# Patient Record
Sex: FEMALE
[De-identification: names, ages, dates, MRNs, and addresses within clinical notes are randomized per-mention and may not be internally consistent; named-entity substitution may affect disease eponyms.]

---

## 2018-10-30 ENCOUNTER — HOSPITAL ENCOUNTER (INPATIENT)
Dept: HOSPITAL 31 - C.ER | Age: 63
LOS: 2 days | Discharge: HOME | DRG: 249 | End: 2018-11-01
Attending: HOSPITALIST | Admitting: HOSPITALIST
Payer: MEDICAID

## 2018-10-30 VITALS — BODY MASS INDEX: 17.7 KG/M2

## 2018-10-30 DIAGNOSIS — E86.0: Primary | ICD-10-CM

## 2018-10-30 DIAGNOSIS — E87.2: ICD-10-CM

## 2018-10-30 DIAGNOSIS — K52.9: ICD-10-CM

## 2018-10-30 DIAGNOSIS — R63.4: ICD-10-CM

## 2018-10-30 DIAGNOSIS — E87.6: ICD-10-CM

## 2018-10-30 LAB
ALBUMIN SERPL-MCNC: 2.8 G/DL (ref 3.5–5)
ALBUMIN/GLOB SERPL: 0.8 {RATIO} (ref 1–2.1)
ALT SERPL-CCNC: 42 U/L (ref 9–52)
APTT BLD: 44 SECONDS (ref 21–34)
AST SERPL-CCNC: 32 U/L (ref 14–36)
BACTERIA #/AREA URNS HPF: (no result) /[HPF]
BASE EXCESS BLDV CALC-SCNC: -13.7 MMOL/L (ref 0–2)
BASOPHILS # BLD AUTO: 0.1 K/UL (ref 0–0.2)
BASOPHILS NFR BLD: 0.9 % (ref 0–2)
BILIRUB UR-MCNC: NEGATIVE MG/DL
BUN SERPL-MCNC: 2 MG/DL (ref 7–17)
BUN SERPL-MCNC: < 2 MG/DL (ref 7–17)
CALCIUM SERPL-MCNC: 8.2 MG/DL (ref 8.6–10.4)
CALCIUM SERPL-MCNC: 8.7 MG/DL (ref 8.6–10.4)
EOSINOPHIL # BLD AUTO: 0 K/UL (ref 0–0.7)
EOSINOPHIL NFR BLD: 0.4 % (ref 0–4)
ERYTHROCYTE [DISTWIDTH] IN BLOOD BY AUTOMATED COUNT: 14.9 % (ref 11.5–14.5)
GFR NON-AFRICAN AMERICAN: > 60
GFR NON-AFRICAN AMERICAN: > 60
GLUCOSE UR STRIP-MCNC: NORMAL MG/DL
HGB BLD-MCNC: 13.4 G/DL (ref 11–16)
HYALINE CASTS #/AREA URNS LPF: (no result) /LPF (ref 0–2)
INR PPP: 1.7
LEUKOCYTE ESTERASE UR-ACNC: (no result) LEU/UL
LIPASE: 107 U/L (ref 23–300)
LYMPHOCYTES # BLD AUTO: 3.8 K/UL (ref 1–4.3)
LYMPHOCYTES NFR BLD AUTO: 31.7 % (ref 20–40)
MCH RBC QN AUTO: 32.7 PG (ref 27–31)
MCHC RBC AUTO-ENTMCNC: 32.9 G/DL (ref 33–37)
MCV RBC AUTO: 99.2 FL (ref 81–99)
MONOCYTES # BLD: 0.9 K/UL (ref 0–0.8)
MONOCYTES NFR BLD: 7.8 % (ref 0–10)
NEUTROPHILS # BLD: 7 K/UL (ref 1.8–7)
NEUTROPHILS NFR BLD AUTO: 59.2 % (ref 50–75)
NRBC BLD AUTO-RTO: 0.1 % (ref 0–2)
PCO2 BLDV: 34 MMHG (ref 40–60)
PH BLDV: 7.2 [PH] (ref 7.32–7.43)
PH UR STRIP: 5 [PH] (ref 5–8)
PLATELET # BLD: 346 K/UL (ref 130–400)
PMV BLD AUTO: 9.8 FL (ref 7.2–11.7)
PROT UR STRIP-MCNC: NEGATIVE MG/DL
PROTHROMBIN TIME: 18.6 SECONDS (ref 9.7–12.2)
RBC # BLD AUTO: 4.1 MIL/UL (ref 3.8–5.2)
RBC # UR STRIP: (no result) /UL
SP GR UR STRIP: 1 (ref 1–1.03)
SQUAMOUS EPITHIAL: < 1 /HPF (ref 0–5)
UROBILINOGEN UR-MCNC: NORMAL MG/DL (ref 0.2–1)
VENOUS BLOOD GAS PO2: 19 MM/HG (ref 30–55)
WBC # BLD AUTO: 11.9 K/UL (ref 4.8–10.8)

## 2018-10-30 NOTE — CP.PCM.HP
<Ashvin Lynch - Last Filed: 10/30/18 16:51>





History of Present Illness





- History of Present Illness


History of Present Illness: 


Ashvin Lynch H & P for Dr. Maier





Patient is a 63 year old female with no significant past medical history 

presenting with non-bloody diarrhea. She states that she has been having 

diarrhea for at least 2 months and it is not resolving. She describes the diarr

hea to be watery, non-bloody, and she usually gets 2-3 episodes per day. She 

denies having any abdominal pain. She is also complaining of generalized 

weakness and fatigue. She states that she has not eaten anything today due to 

feeling nausea but she denies vomiting. Patient admits to recent travel to [facility] 2 weeks ago but denies getting sick. She states that she had a 

colonoscopy 2 weeks ago and the results were normal. She also admits to recent 

antibiotics use that was given to her in ED last month for diarrhea. She denies 

fevers, chills, cough, shortness of breath, chest pain, or urinary symptoms.





PMD: [facility]


PMH: denies


Allergies: NKDA


Social Hx: denies smoking, alcohol, or drug use. Lives with her son and daughter

at home.


Surgical Hx: Cholecystectomy 2015


Family Hx: denies


Medications: none





Present on Admission





- Present on Admission


Any Indicators Present on Admission: No


History of DVT/PE: No


History of Uncontrolled Diabetes: No


Urinary Catheter: No


Decubitus Ulcer Present: No





Review of Systems





- Review of Systems


All systems: reviewed and no additional remarkable complaints except





Past Patient History





- Infectious Disease


Hx of Infectious Diseases: None





- Past Social History


Smoking Status: Never Smoked





- CARDIAC


Hx Hypertension: No





- GASTROINTESTINAL


Hx Gastritis: Yes





- PSYCHIATRIC


Hx Substance Use: No





- SURGICAL HISTORY


Hx Cholecystectomy: Yes





- ANESTHESIA


Hx Anesthesia: Yes


Hx Anesthesia Reactions: No





Meds


Allergies/Adverse Reactions: 


                                    Allergies











Allergy/AdvReac Type Severity Reaction Status Date / Time


 


No Known Allergies Allergy   Verified 10/30/18 05:24














Physical Exam





- Constitutional


Appears: Non-toxic, No Acute Distress


Additional comments: 


Lethargic





- Head Exam


Head Exam: ATRAUMATIC, NORMOCEPHALIC





- Eye Exam


Eye Exam: EOMI, Normal appearance, PERRL.  absent: Conjunctival injection, 

Scleral icterus





- ENT Exam


ENT Exam: Mucous Membranes Dry





- Respiratory Exam


Respiratory Exam: Clear to Auscultation Bilateral.  absent: Accessory Muscle 

Use, Rales, Rhonchi, Wheezes, Respiratory Distress





- Cardiovascular Exam


Cardiovascular Exam: RRR, +S1, +S2.  absent: Bradycardia, Tachycardia, Gallop, 

Rubs, Systolic Murmur





- GI/Abdominal Exam


GI & Abdominal Exam: Hyperactive Bowel Sounds, Soft.  absent: Distended, Firm, 

Guarding, Rebound, Tenderness





- Extremities Exam


Extremities exam: Positive for: normal inspection.  Negative for: calf 

tenderness, pedal edema





- Neurological Exam


Neurological exam: Alert, CN II-XII Intact, Oriented x3





- Psychiatric Exam


Psychiatric exam: Normal Affect, Normal Mood





- Skin


Skin Exam: Dry, Intact, Normal Color, Warm





Results





- Vital Signs


Recent Vital Signs: 





                                Last Vital Signs











Temp  97.5 F L  10/30/18 10:48


 


Pulse  66   10/30/18 10:48


 


Resp  20   10/30/18 10:48


 


BP  139/80   10/30/18 10:48


 


Pulse Ox  100   10/30/18 10:48














- Labs


Result Diagrams: 


                                 10/30/18 06:17





                                 10/30/18 14:23


Labs: 





                         Laboratory Results - last 24 hr











  10/30/18 10/30/18 10/30/18





  06:17 06:17 06:17


 


WBC  11.9 H D  


 


RBC  4.10  


 


Hgb  13.4  


 


Hct  40.6  


 


MCV  99.2 H D  


 


MCH  32.7 H  


 


MCHC  32.9 L  


 


RDW  14.9 H  


 


Plt Count  346  D  


 


MPV  9.8  


 


Neut % (Auto)  59.2  


 


Lymph % (Auto)  31.7  


 


Mono % (Auto)  7.8  


 


Eos % (Auto)  0.4  


 


Baso % (Auto)  0.9  


 


Neut # (Auto)  7.0  


 


Lymph # (Auto)  3.8  


 


Mono # (Auto)  0.9 H  


 


Eos # (Auto)  0.0  


 


Baso # (Auto)  0.1  


 


PT   18.6 H 


 


INR   1.7 


 


APTT   44 H 


 


pO2   


 


VBG pH   


 


VBG pCO2   


 


VBG HCO3   


 


VBG Total CO2   


 


VBG O2 Sat (Calc)   


 


VBG Base Excess   


 


VBG Potassium   


 


Glucose   


 


Lactate   


 


Crit Value Called To   


 


Crit Value Called By   


 


Crit Value Read Back   


 


Blood Gas Notified Time   


 


Sodium    143


 


Potassium    2.6 L


 


Chloride    109 H


 


Carbon Dioxide    13 L


 


Anion Gap    22 H


 


BUN    2 L


 


Creatinine    0.6 L


 


Est GFR ( Amer)    > 60


 


Est GFR (Non-Af Amer)    > 60


 


Random Glucose    111 H


 


Calcium    8.7


 


Magnesium    1.9


 


Total Bilirubin    0.4


 


AST    32


 


ALT    42


 


Alkaline Phosphatase    123


 


Total Protein    6.2 L


 


Albumin    2.8 L D


 


Globulin    3.3


 


Albumin/Globulin Ratio    0.8 L


 


Lipase    107


 


Venous Blood Potassium   


 


Urine Color   


 


Urine Clarity   


 


Urine pH   


 


Ur Specific Gravity   


 


Urine Protein   


 


Urine Glucose (UA)   


 


Urine Ketones   


 


Urine Blood   


 


Urine Nitrate   


 


Urine Bilirubin   


 


Urine Urobilinogen   


 


Ur Leukocyte Esterase   


 


Urine WBC (Auto)   


 


Urine RBC (Auto)   


 


Ur Squamous Epith Cells   


 


Urine Bacteria   


 


Hyaline Casts   














  10/30/18 10/30/18





  06:20 08:02


 


WBC  


 


RBC  


 


Hgb  


 


Hct  


 


MCV  


 


MCH  


 


MCHC  


 


RDW  


 


Plt Count  


 


MPV  


 


Neut % (Auto)  


 


Lymph % (Auto)  


 


Mono % (Auto)  


 


Eos % (Auto)  


 


Baso % (Auto)  


 


Neut # (Auto)  


 


Lymph # (Auto)  


 


Mono # (Auto)  


 


Eos # (Auto)  


 


Baso # (Auto)  


 


PT  


 


INR  


 


APTT  


 


pO2  19 L 


 


VBG pH  7.20 L 


 


VBG pCO2  34 L 


 


VBG HCO3  12.1 


 


VBG Total CO2  14.3 L 


 


VBG O2 Sat (Calc)  36.7 L 


 


VBG Base Excess  -13.7 L 


 


VBG Potassium  2.1 L* 


 


Glucose  102 


 


Lactate  1.7 


 


Crit Value Called To  seun El.rn 


 


Crit Value Called By  Eulogio barber 


 


Crit Value Read Back  Y 


 


Blood Gas Notified Time  624 


 


Sodium  141.0 


 


Potassium  


 


Chloride  110.0 H 


 


Carbon Dioxide  


 


Anion Gap  


 


BUN  


 


Creatinine  


 


Est GFR ( Amer)  


 


Est GFR (Non-Af Amer)  


 


Random Glucose  


 


Calcium  


 


Magnesium  


 


Total Bilirubin  


 


AST  


 


ALT  


 


Alkaline Phosphatase  


 


Total Protein  


 


Albumin  


 


Globulin  


 


Albumin/Globulin Ratio  


 


Lipase  


 


Venous Blood Potassium  2.1 L* 


 


Urine Color   Colorless


 


Urine Clarity   Clear


 


Urine pH   5.0


 


Ur Specific Gravity   1.005


 


Urine Protein   Negative


 


Urine Glucose (UA)   Normal


 


Urine Ketones   Negative


 


Urine Blood   1+ H


 


Urine Nitrate   Negative


 


Urine Bilirubin   Negative


 


Urine Urobilinogen   Normal


 


Ur Leukocyte Esterase   Neg


 


Urine WBC (Auto)   < 1


 


Urine RBC (Auto)   2


 


Ur Squamous Epith Cells   < 1


 


Urine Bacteria   Occ H


 


Hyaline Casts   0-2














Assessment & Plan





- Assessment and Plan (Free Text)


Assessment: 


Patient is a 63 year old female with no significant past medical history 

presenting with non-bloody diarrhea for 2 months. She was also found to have a 

potassium level of 2.6 in the ED.





Plan: 


Metabolic acidosis:


- Likely secondary to chronic diarrhea


- Start D5W with 10 mEq of Potassium @ 100mls/hr


- Repeat BMP @ 18:00


- Received 2 NS bolus in the ED


- CXR at 16:00, rule out fluid overload


- Stool culture


- Stool ova and parasite


- H. pylori antigen


- C. diff antigen and antibody


- Ask patient's family member to bring in coloscopy results





Hypokalemia:


- Likely secondary to chronic diarrhea


- Received Potassium 20 mEq IV and Potassium 20 mEq PO in the ED


- Potassium 20mEq IV x 3 bags


- Received 1 g Magnesium in the ED


- Mg level today 1.9


- Repeat BMP @ 18:00


- Serum aldosterone and cortisol levels


- Follow up morning labs 


- Replete electrolytes as needed





Prophylaxis/diet:


- Lovenox 40mg QD


- Regular diet





Case discussed with Dr. Livier Lynch PGY-1








<Mendoza Maier - Last Filed: 10/31/18 07:24>





Results





- Vital Signs


Recent Vital Signs: 





                                Last Vital Signs











Temp  98.1 F   10/30/18 15:00


 


Pulse  82   10/30/18 16:57


 


Resp  20   10/30/18 15:00


 


BP  126/83   10/30/18 15:00


 


Pulse Ox  100   10/30/18 15:00














- Labs


Result Diagrams: 


                                 10/30/18 06:17





                                 10/30/18 14:23


Labs: 





                         Laboratory Results - last 24 hr











  10/30/18 10/30/18 10/30/18





  06:17 06:17 06:17


 


WBC  11.9 H D  


 


RBC  4.10  


 


Hgb  13.4  


 


Hct  40.6  


 


MCV  99.2 H D  


 


MCH  32.7 H  


 


MCHC  32.9 L  


 


RDW  14.9 H  


 


Plt Count  346  D  


 


MPV  9.8  


 


Neut % (Auto)  59.2  


 


Lymph % (Auto)  31.7  


 


Mono % (Auto)  7.8  


 


Eos % (Auto)  0.4  


 


Baso % (Auto)  0.9  


 


Neut # (Auto)  7.0  


 


Lymph # (Auto)  3.8  


 


Mono # (Auto)  0.9 H  


 


Eos # (Auto)  0.0  


 


Baso # (Auto)  0.1  


 


PT   18.6 H 


 


INR   1.7 


 


APTT   44 H 


 


pO2   


 


VBG pH   


 


VBG pCO2   


 


VBG HCO3   


 


VBG Total CO2   


 


VBG O2 Sat (Calc)   


 


VBG Base Excess   


 


VBG Potassium   


 


Glucose   


 


Lactate   


 


Crit Value Called To   


 


Crit Value Called By   


 


Crit Value Read Back   


 


Blood Gas Notified Time   


 


Sodium    143


 


Potassium    2.6 L


 


Chloride    109 H


 


Carbon Dioxide    13 L


 


Anion Gap    22 H


 


BUN    2 L


 


Creatinine    0.6 L


 


Est GFR ( Amer)    > 60


 


Est GFR (Non-Af Amer)    > 60


 


Random Glucose    111 H


 


Calcium    8.7


 


Magnesium    1.9


 


Total Bilirubin    0.4


 


AST    32


 


ALT    42


 


Alkaline Phosphatase    123


 


Total Protein    6.2 L


 


Albumin    2.8 L D


 


Globulin    3.3


 


Albumin/Globulin Ratio    0.8 L


 


Lipase    107


 


Free T4   


 


TSH 3rd Generation   


 


Cortisol AM Sample   


 


Venous Blood Potassium   


 


Urine Color   


 


Urine Clarity   


 


Urine pH   


 


Ur Specific Gravity   


 


Urine Protein   


 


Urine Glucose (UA)   


 


Urine Ketones   


 


Urine Blood   


 


Urine Nitrate   


 


Urine Bilirubin   


 


Urine Urobilinogen   


 


Ur Leukocyte Esterase   


 


Urine WBC (Auto)   


 


Urine RBC (Auto)   


 


Ur Squamous Epith Cells   


 


Urine Bacteria   


 


Hyaline Casts   














  10/30/18 10/30/18 10/30/18





  06:20 08:02 14:23


 


WBC   


 


RBC   


 


Hgb   


 


Hct   


 


MCV   


 


MCH   


 


MCHC   


 


RDW   


 


Plt Count   


 


MPV   


 


Neut % (Auto)   


 


Lymph % (Auto)   


 


Mono % (Auto)   


 


Eos % (Auto)   


 


Baso % (Auto)   


 


Neut # (Auto)   


 


Lymph # (Auto)   


 


Mono # (Auto)   


 


Eos # (Auto)   


 


Baso # (Auto)   


 


PT   


 


INR   


 


APTT   


 


pO2  19 L  


 


VBG pH  7.20 L  


 


VBG pCO2  34 L  


 


VBG HCO3  12.1  


 


VBG Total CO2  14.3 L  


 


VBG O2 Sat (Calc)  36.7 L  


 


VBG Base Excess  -13.7 L  


 


VBG Potassium  2.1 L*  


 


Glucose  102  


 


Lactate  1.7  


 


Crit Value Called To  dahlia Elrn  


 


Crit Value Called By  Eulogio barber  


 


Crit Value Read Back  Y  


 


Blood Gas Notified Time  624  


 


Sodium  141.0  


 


Potassium   


 


Chloride  110.0 H  


 


Carbon Dioxide   


 


Anion Gap   


 


BUN   


 


Creatinine   


 


Est GFR ( Amer)   


 


Est GFR (Non-Af Amer)   


 


Random Glucose   


 


Calcium   


 


Magnesium   


 


Total Bilirubin   


 


AST   


 


ALT   


 


Alkaline Phosphatase   


 


Total Protein   


 


Albumin   


 


Globulin   


 


Albumin/Globulin Ratio   


 


Lipase   


 


Free T4   


 


TSH 3rd Generation   


 


Cortisol AM Sample    9.1


 


Venous Blood Potassium  2.1 L*  


 


Urine Color   Colorless 


 


Urine Clarity   Clear 


 


Urine pH   5.0 


 


Ur Specific Gravity   1.005 


 


Urine Protein   Negative 


 


Urine Glucose (UA)   Normal 


 


Urine Ketones   Negative 


 


Urine Blood   1+ H 


 


Urine Nitrate   Negative 


 


Urine Bilirubin   Negative 


 


Urine Urobilinogen   Normal 


 


Ur Leukocyte Esterase   Neg 


 


Urine WBC (Auto)   < 1 


 


Urine RBC (Auto)   2 


 


Ur Squamous Epith Cells   < 1 


 


Urine Bacteria   Occ H 


 


Hyaline Casts   0-2 














  10/30/18 10/30/18





  14:23 14:23


 


WBC  


 


RBC  


 


Hgb  


 


Hct  


 


MCV  


 


MCH  


 


MCHC  


 


RDW  


 


Plt Count  


 


MPV  


 


Neut % (Auto)  


 


Lymph % (Auto)  


 


Mono % (Auto)  


 


Eos % (Auto)  


 


Baso % (Auto)  


 


Neut # (Auto)  


 


Lymph # (Auto)  


 


Mono # (Auto)  


 


Eos # (Auto)  


 


Baso # (Auto)  


 


PT  


 


INR  


 


APTT  


 


pO2  


 


VBG pH  


 


VBG pCO2  


 


VBG HCO3  


 


VBG Total CO2  


 


VBG O2 Sat (Calc)  


 


VBG Base Excess  


 


VBG Potassium  


 


Glucose  


 


Lactate  


 


Crit Value Called To  


 


Crit Value Called By  


 


Crit Value Read Back  


 


Blood Gas Notified Time  


 


Sodium  143 


 


Potassium  2.8 L 


 


Chloride  113 H 


 


Carbon Dioxide  17 L 


 


Anion Gap  16 


 


BUN  < 2 L 


 


Creatinine  0.5 L 


 


Est GFR ( Amer)  > 60 


 


Est GFR (Non-Af Amer)  > 60 


 


Random Glucose  102 


 


Calcium  8.2 L 


 


Magnesium  


 


Total Bilirubin  


 


AST  


 


ALT  


 


Alkaline Phosphatase  


 


Total Protein  


 


Albumin  


 


Globulin  


 


Albumin/Globulin Ratio  


 


Lipase  


 


Free T4   0.91


 


TSH 3rd Generation  1.64 


 


Cortisol AM Sample  


 


Venous Blood Potassium  


 


Urine Color  


 


Urine Clarity  


 


Urine pH  


 


Ur Specific Gravity  


 


Urine Protein  


 


Urine Glucose (UA)  


 


Urine Ketones  


 


Urine Blood  


 


Urine Nitrate  


 


Urine Bilirubin  


 


Urine Urobilinogen  


 


Ur Leukocyte Esterase  


 


Urine WBC (Auto)  


 


Urine RBC (Auto)  


 


Ur Squamous Epith Cells  


 


Urine Bacteria  


 


Hyaline Casts  














Attending/Attestation





- Attestation


I have personally seen and examined this patient.: Yes


I have fully participated in the care of the patient.: Yes


I have reviewed all pertinent clinical information: Yes


Notes (Text): 





10/31/18 07:18


Medical attending: Patient was seen and examined by me. Agree with the above 

note by the resident


The patient was seen and examined by me as well. The patient was not in acute 

distress however on exam she is a smaller female patient who looks very tired, 

almost cahectic appearance. The patient from what I understand has diarrhea and 

it has been going on for some time. 





She has had several visits to ER because of the diarrhea however she's never had

a metabolic acidosis like this before. She is not having any pain at this time.


We do need to send for stool studies, including C diff as it appears she may 

have been taking abx from somewhere else. Also check TSH, Aldosterone, Cortisol.





The patient will be placed on IVF with KCL since her K is low. Also will need to

monitor her serum bicarb as well and if need be also get ABGs. 





Mendoza Maier

## 2018-10-30 NOTE — C.PDOC
Addendum entered and electronically signed by Favian Banegas DO  10/30/18 09:47: 








Addendum


Addendum: 





10/30/18 08:23


Labs reviewed, patient with low blood potassium of 2.6 


Patient given PO Potassium, IV Magnesium.


Urinalysis reviewed, no signs of UTI.





10/30/18 09:11


Patient noted to have collapsed while attempting to use the bathroom and found 

on the floor by RN. 


Patient to be admitted due to acute gastroenteritis, and hypokalemia.


Call placed to hospitalist on call.





10/30/18 09:25


Case discussed with Dr. Maier and patient to be admitted under him.





Original Note:








History Of Present Illness


63 year old female presents to the ER with a complaint of feeling weak, 

associated with vomiting and diarrhea. Patient has been unable to tolerate PO. 

She took medications while in the [place] with no relief. Denies 

fever or chills.


Time Seen by Provider: 10/30/18 05:36


Chief Complaint (Nursing): GI Problem


History Per: Patient


History/Exam Limitations: no limitations


Onset/Duration Of Symptoms: Days


Current Symptoms Are (Timing): Still Present


Severity: Moderate


Pain Scale Rating Of: 4


Location Of Pain/Discomfort: Diffuse


Quality Of Discomfort: Unable To Describe


Associated Symptoms: Vomiting, Diarrhea, Other (not tolerating PO, feeling 

weak).  denies: Fever, Chills


Exacerbating Factors: None


Alleviating Factors: None


Recent travel outside of the [place]: No


Abnormal Vaginal Bleeding: No





Past Medical History


Reviewed: Historical Data, Nursing Documentation, Vital Signs


Vital Signs: 





                                Last Vital Signs











Temp  98.2 F   10/30/18 05:31


 


Pulse  81   10/30/18 05:31


 


Resp  22   10/30/18 05:31


 


BP  157/80 H  10/30/18 05:31


 


Pulse Ox  97   10/30/18 05:31














- Medical History


PMH: Gastritis


   Denies: HTN


Surgical History: Cholecystectomy


Family History: States: No Known Family Hx





- Social History


Hx Tobacco Use: No


Hx Alcohol Use: No


Hx Substance Use: No





- Immunization History


Hx Tetanus Toxoid Vaccination: No


Hx Influenza Vaccination: No


Hx Pneumococcal Vaccination: No





Review Of Systems


Constitutional: Positive for: Weakness.  Negative for: Fever, Chills


Cardiovascular: Negative for: Chest Pain, Palpitations


Respiratory: Negative for: Cough, Shortness of Breath


Gastrointestinal: Positive for: Vomiting, Diarrhea





Physical Exam





- Physical Exam


Appears: Non-toxic


Skin: Warm, Dry


Head: Normacephalic


Oral Mucosa: Moist


Chest: Symmetrical, No Tenderness


Cardiovascular: Rhythm Regular


Respiratory: No Rales, No Rhonchi, No Wheezing


Gastrointestinal/Abdominal: Soft, Tenderness (Mild diffuse), No Guarding, No 

Rebound


Back: No CVA Tenderness


Neurological/Psych: Oriented x3, Normal Speech





ED Course And Treatment





- Laboratory Results


Result Diagrams: 


                                 10/30/18 06:17





                                 10/30/18 06:17


O2 Sat by Pulse Oximetry: 97 (Room air)


Pulse Ox Interpretation: Normal


Progress Note: Blood work and urinalysis ordered. Protonix, zofran, and IV 

fluids administered.





Disposition


Counseled Patient/Family Regarding: Studies Performed, Diagnosis





- Disposition


Referrals: 


Clinic,Med Surg [Primary Care Provider] - 


Disposition Time: 05:36


Condition: FAIR


Forms:  [facility] (English)





- Clinical Impression


Clinical Impression: 


 Diarrhea, Abdominal pain, Nausea & vomiting








- Scribe Statement


The provider has reviewed the documentation as recorded by the Scribe





Ronnie Alexandra





All medical record entries made by the Scribe were at my direction and 

personally dictated by me. I have reviewed the chart and agree that the record 

accurately reflects my personal performance of the history, physical exam, 

medical decision making, and the department course for this patient. I have also

personally directed, reviewed, and agree with the discharge instructions and 

disposition.





Physician Patient Turnover


Patient Signed Over To: Favian Banegas


Handoff Comments: pending re-eval and dispo

## 2018-10-30 NOTE — RAD
Date of service: 



10/30/2018



HISTORY:

 rule out fluid overload 



COMPARISON:

Obstructive series dated 05/03/2018. 



FINDINGS:



LUNGS:

No active pulmonary disease.



PLEURA:

Calcified pleural plaques redemonstrated.  No significant pleural 

effusion identified, no pneumothorax apparent.



CARDIOVASCULAR:

Aortic atherosclerotic calcifications.  Cardiomediastinal silhouette 

within normal limits. 



OSSEOUS STRUCTURES:

Unchanged.



VISUALIZED UPPER ABDOMEN:

Quadrant surgical clips redemonstrated.



OTHER FINDINGS:

None.



IMPRESSION:

No active disease.

## 2018-10-31 VITALS — RESPIRATION RATE: 20 BRPM

## 2018-10-31 LAB
ALBUMIN SERPL-MCNC: 2.1 G/DL (ref 3.5–5)
ALBUMIN/GLOB SERPL: 0.7 {RATIO} (ref 1–2.1)
ALT SERPL-CCNC: 43 U/L (ref 9–52)
AST SERPL-CCNC: 23 U/L (ref 14–36)
BASOPHILS # BLD AUTO: 0.1 K/UL (ref 0–0.2)
BASOPHILS NFR BLD: 1.4 % (ref 0–2)
BUN SERPL-MCNC: < 2 MG/DL (ref 7–17)
CALCIUM SERPL-MCNC: 8.1 MG/DL (ref 8.6–10.4)
EOSINOPHIL # BLD AUTO: 0.1 K/UL (ref 0–0.7)
EOSINOPHIL NFR BLD: 1.3 % (ref 0–4)
ERYTHROCYTE [DISTWIDTH] IN BLOOD BY AUTOMATED COUNT: 14.3 % (ref 11.5–14.5)
GFR NON-AFRICAN AMERICAN: > 60
HGB BLD-MCNC: 12.1 G/DL (ref 11–16)
LYMPHOCYTES # BLD AUTO: 2.8 K/UL (ref 1–4.3)
LYMPHOCYTES NFR BLD AUTO: 36.2 % (ref 20–40)
MCH RBC QN AUTO: 33.3 PG (ref 27–31)
MCHC RBC AUTO-ENTMCNC: 34.3 G/DL (ref 33–37)
MCV RBC AUTO: 97.1 FL (ref 81–99)
MONOCYTES # BLD: 0.8 K/UL (ref 0–0.8)
MONOCYTES NFR BLD: 10.2 % (ref 0–10)
NEUTROPHILS # BLD: 4 K/UL (ref 1.8–7)
NEUTROPHILS NFR BLD AUTO: 50.9 % (ref 50–75)
NRBC BLD AUTO-RTO: 0 % (ref 0–2)
PLATELET # BLD: 291 K/UL (ref 130–400)
PMV BLD AUTO: 9.3 FL (ref 7.2–11.7)
RBC # BLD AUTO: 3.62 MIL/UL (ref 3.8–5.2)
WBC # BLD AUTO: 7.8 K/UL (ref 4.8–10.8)

## 2018-10-31 RX ADMIN — MAGNESIUM SULFATE IN DEXTROSE SCH MLS/HR: 10 INJECTION, SOLUTION INTRAVENOUS at 16:40

## 2018-10-31 RX ADMIN — MAGNESIUM SULFATE IN DEXTROSE SCH: 10 INJECTION, SOLUTION INTRAVENOUS at 20:25

## 2018-10-31 RX ADMIN — ENOXAPARIN SODIUM SCH MG: 40 INJECTION SUBCUTANEOUS at 09:54

## 2018-10-31 RX ADMIN — MAGNESIUM SULFATE IN DEXTROSE SCH MLS/HR: 10 INJECTION, SOLUTION INTRAVENOUS at 17:58

## 2018-10-31 RX ADMIN — MAGNESIUM SULFATE IN DEXTROSE SCH: 10 INJECTION, SOLUTION INTRAVENOUS at 18:15

## 2018-10-31 NOTE — CP.PCM.PN
<Kathy Powers - Last Filed: 10/31/18 13:58>





Subjective





- Date & Time of Evaluation


Date of Evaluation: 10/31/18


Time of Evaluation: 13:50





- Subjective


Subjective: 





Patient seen and examined at bedside. She states she has not had a BM ever since

she arrived to the hospital. Patient also denies abdominal pain.





Objective





- Vital Signs/Intake and Output


Vital Signs (last 24 hours): 


                                        











Temp Pulse Resp BP Pulse Ox


 


 98.4 F   80   18   113/72   100 


 


 10/31/18 07:05  10/31/18 12:00  10/31/18 07:05  10/31/18 07:05  10/31/18 07:05








Intake and Output: 


                                        











 10/31/18 10/31/18





 06:59 18:59


 


Intake Total 120 


 


Output Total 800 


 


Balance -680 














- Medications


Medications: 


                               Current Medications





Enoxaparin Sodium (Lovenox)  40 mg SC DAILY [facility]


   Last Admin: 10/31/18 09:54 Dose:  40 mg











- Labs


Labs: 


                                        





                                 10/31/18 08:18 





                                 10/31/18 08:18 





                                        











PT  18.6 SECONDS (9.7-12.2)  H  10/30/18  06:17    


 


INR  1.7   10/30/18  06:17    


 


APTT  44 SECONDS (21-34)  H  10/30/18  06:17    














- Constitutional


Appears: Well (Thin  woman resting in bed, conversing, in no acute 

distress)





- Head Exam


Head Exam: ATRAUMATIC, NORMAL INSPECTION





- Eye Exam


Eye Exam: EOMI, Normal appearance





- Neck Exam


Neck Exam: Normal Inspection





- Respiratory Exam


Respiratory Exam: Clear to Ausculation Bilateral, NORMAL BREATHING PATTERN





- Cardiovascular Exam


Cardiovascular Exam: REGULAR RHYTHM





- GI/Abdominal Exam


GI & Abdominal Exam: Soft, Normal Bowel Sounds





- Neurological Exam


Neurological Exam: Alert, Awake, Oriented x3





- Psychiatric Exam


Psychiatric exam: Normal Affect, Normal Mood





- Skin


Skin Exam: Dry, Intact, Normal Color, Warm





Assessment and Plan





- Assessment and Plan (Free Text)


Assessment: 


63 year old female with no significant past medical history presented with non-

bloody diarrhea x 2 months and incidental hypokalemia in the ED on 10/30. She is

currently stable on medicine floor. 





Diarrhea


-Patient's diarrhea has resolved for almost 24 hours. Will continue to monitor 

for at least one more night. She remains afebrile (tmax 98.4) and leukocytosis 

of 11.9 on admission has resolved.


-pending stool cx, O&P, h. pylori antigen, c. diff


-Pending view of colonoscopy results 2 weeks ago from the [place]. 

Patient states it is at home and she does not know when her family will bring 

them. She states it was normal and the reason why she got it was due to her 

diarrhea, not colon CA screening. Will have to verify results with the document.





Weight loss


-Upon further questioning today, patient states she lost 30 lbs the last 5 m

onths (130 lbs to 100 lbs, the latter on admission). 


-TSH and FT4 wnl


-consider CT at a later time to r/o malignancy





Metabolic acidosis - resolved


-likely 2/2 her chronic diarrhea


-may continue with D5W and add 10 mEq of K, but r/o fluid overload s/p fluid 

infusions (CXR PRN and ask for respiratory sx, f/u with lung exam). 


-CXR wnl as of now


-HCO3 has increased from 17 to 23 (now wnl)





Hypokalemia - resolved


-also likely 2/2 her chronic diarrhea


-replete as needed with Potassium 20 mEQ PO (or IV infusion)


-CMP qAM


-pending aldosterone and cortisol levels





DVT ppx: lovenox 40 mg QD


GI ppx: not indicated








Case discussed with Dr. Livier Powers PGY-1








<Mendoza Maier - Last Filed: 10/31/18 15:30>





Objective





- Vital Signs/Intake and Output


Vital Signs (last 24 hours): 


                                        











Temp Pulse Resp BP Pulse Ox


 


 98.4 F   80   18   113/72   100 


 


 10/31/18 07:05  10/31/18 12:00  10/31/18 07:05  10/31/18 07:05  10/31/18 07:05








Intake and Output: 


                                        











 10/31/18 10/31/18





 06:59 18:59


 


Intake Total 120 300


 


Output Total 800 


 


Balance -680 300














- Medications


Medications: 


                               Current Medications





Enoxaparin Sodium (Lovenox)  40 mg SC DAILY [facility]


   Last Admin: 10/31/18 09:54 Dose:  40 mg


Magnesium Sulfate/Dextrose (Magnesium Sulfate 1 Gm/100 Ml D5w)  1 gm in 100 mls 

@ 200 mls/hr IVPB Q30M [facility]


   Stop: 10/31/18 16:29











- Labs


Labs: 


                                        





                                 10/31/18 08:18 





                                 10/31/18 08:18 





                                        











PT  18.6 SECONDS (9.7-12.2)  H  10/30/18  06:17    


 


INR  1.7   10/30/18  06:17    


 


APTT  44 SECONDS (21-34)  H  10/30/18  06:17    














Attending/Attestation





- Attestation


I have personally seen and examined this patient.: Yes


I have fully participated in the care of the patient.: Yes


I have reviewed all pertinent clinical information, including history, physical 

exam and plan: Yes


Notes (Text): 





10/31/18 15:22





Medical attending: Patient was seen and examined by me. Agree with the above 

note by the resident


The patient was not in any acute distress when I came and saw the patient


The patient was much more awake and alert this morning when we came and saw her.




Review of the lab work shows the K was better. She remains on the IVF with K and

also had additional KCL given as well


The patient also has resolution of the metabolic acidosis as well - serum bicarb

was a lot better


As for the diarrhea she says it has resolved. Now and she is urinating a lot





We are still pending stool studies, C diff was negative, pending stool O + P


Also reviewed the CXRAY as she has gotten a lot of IVF - the CXRAY looks ok - 

not fluid overload





Maybe tommorow can be discharged, we will have to see how she looks and feels. 





Mendoza Maier

## 2018-10-31 NOTE — CARD
--------------- APPROVED REPORT --------------





Date of service: 10/30/2018



EKG Measurement

Heart [id number]

CO [id number]

[id number]

[id number]

[id number]



<Conclusion>

Normal sinus rhythm

Low voltage QRS

Nonspecific T wave abnormality

Abnormal ECG

## 2018-11-01 VITALS — TEMPERATURE: 98.2 F | DIASTOLIC BLOOD PRESSURE: 66 MMHG | OXYGEN SATURATION: 100 % | SYSTOLIC BLOOD PRESSURE: 101 MMHG

## 2018-11-01 VITALS — HEART RATE: 82 BPM

## 2018-11-01 LAB
ALBUMIN SERPL-MCNC: 2.1 G/DL (ref 3.5–5)
ALBUMIN/GLOB SERPL: 0.7 {RATIO} (ref 1–2.1)
ALT SERPL-CCNC: 37 U/L (ref 9–52)
AST SERPL-CCNC: 26 U/L (ref 14–36)
BASOPHILS # BLD AUTO: 0.1 K/UL (ref 0–0.2)
BASOPHILS NFR BLD: 0.9 % (ref 0–2)
BUN SERPL-MCNC: 4 MG/DL (ref 7–17)
CALCIUM SERPL-MCNC: 7.6 MG/DL (ref 8.6–10.4)
EOSINOPHIL # BLD AUTO: 0 K/UL (ref 0–0.7)
EOSINOPHIL NFR BLD: 0.9 % (ref 0–4)
ERYTHROCYTE [DISTWIDTH] IN BLOOD BY AUTOMATED COUNT: 14.3 % (ref 11.5–14.5)
GFR NON-AFRICAN AMERICAN: > 60
HGB BLD-MCNC: 12.2 G/DL (ref 11–16)
LYMPHOCYTES # BLD AUTO: 2.7 K/UL (ref 1–4.3)
LYMPHOCYTES NFR BLD AUTO: 47.6 % (ref 20–40)
MCH RBC QN AUTO: 34.1 PG (ref 27–31)
MCHC RBC AUTO-ENTMCNC: 35.3 G/DL (ref 33–37)
MCV RBC AUTO: 96.7 FL (ref 81–99)
MONOCYTES # BLD: 0.7 K/UL (ref 0–0.8)
MONOCYTES NFR BLD: 12.4 % (ref 0–10)
NEUTROPHILS # BLD: 2.1 K/UL (ref 1.8–7)
NEUTROPHILS NFR BLD AUTO: 38.2 % (ref 50–75)
NRBC BLD AUTO-RTO: 0.1 % (ref 0–2)
PLATELET # BLD: 270 K/UL (ref 130–400)
PMV BLD AUTO: 9.1 FL (ref 7.2–11.7)
RBC # BLD AUTO: 3.59 MIL/UL (ref 3.8–5.2)
WBC # BLD AUTO: 5.6 K/UL (ref 4.8–10.8)

## 2018-11-01 RX ADMIN — ENOXAPARIN SODIUM SCH MG: 40 INJECTION SUBCUTANEOUS at 10:15

## 2018-11-01 NOTE — CP.PCM.DIS
<Kathy Powers - Last Filed: 11/01/18 12:59>





Provider





- Provider


Date of Admission: 


10/30/18 09:48





Attending physician: 


Mendoza Maier DO





Primary care physician: 


Dr. Vuong ([facility])


Consults: 


none


Time Spent in preparation of Discharge (in minutes): 45





Hospital Course





- Lab Results


Lab Results: 


                             Most Recent Lab Values











WBC  5.6 K/uL (4.8-10.8)   11/01/18  07:39    


 


RBC  3.59 Mil/uL (3.80-5.20)  L  11/01/18  07:39    


 


Hgb  12.2 g/dL (11.0-16.0)   11/01/18  07:39    


 


Hct  34.7 % (34.0-47.0)   11/01/18  07:39    


 


MCV  96.7 fL (81.0-99.0)   11/01/18  07:39    


 


MCH  34.1 pg (27.0-31.0)  H  11/01/18  07:39    


 


MCHC  35.3 g/dL (33.0-37.0)   11/01/18  07:39    


 


RDW  14.3 % (11.5-14.5)   11/01/18  07:39    


 


Plt Count  270 K/uL (130-400)   11/01/18  07:39    


 


MPV  9.1 fL (7.2-11.7)   11/01/18  07:39    


 


Neut % (Auto)  38.2 % (50.0-75.0)  L  11/01/18  07:39    


 


Lymph % (Auto)  47.6 % (20.0-40.0)  H  11/01/18  07:39    


 


Mono % (Auto)  12.4 % (0.0-10.0)  H  11/01/18  07:39    


 


Eos % (Auto)  0.9 % (0.0-4.0)   11/01/18  07:39    


 


Baso % (Auto)  0.9 % (0.0-2.0)   11/01/18  07:39    


 


Neut # (Auto)  2.1 K/uL (1.8-7.0)   11/01/18  07:39    


 


Lymph # (Auto)  2.7 K/uL (1.0-4.3)   11/01/18  07:39    


 


Mono # (Auto)  0.7 K/uL (0.0-0.8)   11/01/18  07:39    


 


Eos # (Auto)  0.0 K/uL (0.0-0.7)   11/01/18  07:39    


 


Baso # (Auto)  0.1 K/uL (0.0-0.2)   11/01/18  07:39    


 


PT  18.6 SECONDS (9.7-12.2)  H  10/30/18  06:17    


 


INR  1.7   10/30/18  06:17    


 


APTT  44 SECONDS (21-34)  H  10/30/18  06:17    


 


pO2  19 mm/Hg (30-55)  L  10/30/18  06:20    


 


VBG pH  7.20  (7.32-7.43)  L  10/30/18  06:20    


 


VBG pCO2  34 mmHg (40-60)  L  10/30/18  06:20    


 


VBG HCO3  12.1 mmol/L  10/30/18  06:20    


 


VBG Total CO2  14.3 mmol/L (22-28)  L  10/30/18  06:20    


 


VBG O2 Sat (Calc)  36.7 % (40-65)  L  10/30/18  06:20    


 


VBG Base Excess  -13.7 mmol/L (0.0-2.0)  L  10/30/18  06:20    


 


VBG Potassium  2.1 mmol/L (3.6-5.2)  L*  10/30/18  06:20    


 


Sodium  141.0 mmol/l (132-148)   10/30/18  06:20    


 


Chloride  110.0 mmol/L ()  H  10/30/18  06:20    


 


Glucose  102 mg/dl ()   10/30/18  06:20    


 


Lactate  1.7 mmol/L (0.7-2.1)   10/30/18  06:20    


 


Crit Value Called To  seun El.rn   10/30/18  06:20    


 


Crit Value Called By  Eulogio barber   10/30/18  06:20    


 


Crit Value Read Back  Y   10/30/18  06:20    


 


Blood Gas Notified Time  624   10/30/18  06:20    


 


Sodium  134 mmol/L (132-148)   11/01/18  07:39    


 


Potassium  4.0 mmol/L (3.6-5.2)   11/01/18  07:39    


 


Chloride  105 mmol/L ()   11/01/18  07:39    


 


Carbon Dioxide  26 mmol/L (22-30)   11/01/18  07:39    


 


Anion Gap  7  (10-20)  L  11/01/18  07:39    


 


BUN  4 mg/dL (7-17)  L  11/01/18  07:39    


 


Creatinine  0.5 mg/dL (0.7-1.2)  L  11/01/18  07:39    


 


Est GFR ( Amer)  > 60   11/01/18  07:39    


 


Est GFR (Non-Af Amer)  > 60   11/01/18  07:39    


 


Random Glucose  77 mg/dL ()   11/01/18  07:39    


 


Calcium  7.6 mg/dl (8.6-10.4)  L  11/01/18  07:39    


 


Phosphorus  5.2 mg/dL (2.5-4.5)  H  11/01/18  07:39    


 


Magnesium  2.1 mg/dL (1.6-2.3)   11/01/18  07:39    


 


Total Bilirubin  0.5 mg/dL (0.2-1.3)   11/01/18  07:39    


 


AST  26 U/L (14-36)   11/01/18  07:39    


 


ALT  37 U/L (9-52)   11/01/18  07:39    


 


Alkaline Phosphatase  100 U/L ()   11/01/18  07:39    


 


Total Protein  5.0 g/dL (6.3-8.3)  L  11/01/18  07:39    


 


Albumin  2.1 g/dL (3.5-5.0)  L  11/01/18  07:39    


 


Globulin  2.9 gm/dL (2.2-3.9)   11/01/18  07:39    


 


Albumin/Globulin Ratio  0.7  (1.0-2.1)  L  11/01/18  07:39    


 


Lipase  107 U/L ()   10/30/18  06:17    


 


Free T4  0.91 ng/dL (0.78-2.19)   10/30/18  14:23    


 


TSH 3rd Generation  1.64 mIU/L (0.46-4.68)   10/30/18  14:23    


 


Cortisol AM Sample  9.1 ug/dL (4.46-22.7)   10/30/18  14:23    


 


Venous Blood Potassium  2.1 mmol/L (3.6-5.2)  L*  10/30/18  06:20    


 


Urine Color  Colorless  (YELLOW)   10/30/18  08:02    


 


Urine Clarity  Clear  (Clear)   10/30/18  08:02    


 


Urine pH  5.0  (5.0-8.0)   10/30/18  08:02    


 


Ur Specific Gravity  1.005  (1.003-1.030)   10/30/18  08:02    


 


Urine Protein  Negative mg/dL (NEGATIVE)   10/30/18  08:02    


 


Urine Glucose (UA)  Normal mg/dL (Normal)   10/30/18  08:02    


 


Urine Ketones  Negative mg/dL (NEGATIVE)   10/30/18  08:02    


 


Urine Blood  1+  (NEGATIVE)  H  10/30/18  08:02    


 


Urine Nitrate  Negative  (NEGATIVE)   10/30/18  08:02    


 


Urine Bilirubin  Negative  (NEGATIVE)   10/30/18  08:02    


 


Urine Urobilinogen  Normal mg/dL (0.2-1.0)   10/30/18  08:02    


 


Ur Leukocyte Esterase  Neg Marion/uL (Negative)   10/30/18  08:02    


 


Urine WBC (Auto)  < 1 /hpf (0-5)   10/30/18  08:02    


 


Urine RBC (Auto)  2 /hpf (0-3)   10/30/18  08:02    


 


Ur Squamous Epith Cells  < 1 /hpf (0-5)   10/30/18  08:02    


 


Urine Bacteria  Occ  (<OCC)  H  10/30/18  08:02    


 


Hyaline Casts  0-2 /lpf (0-2)   10/30/18  08:02    


 


HIV 1&2 Antibody Screen  Negative  (NEGATIVE)   10/31/18  14:01    














- Hospital Course


Hospital Course: 





On presentation:





Patient is a 63 year old female with no significant past medical history 

presenting with non-bloody diarrhea. She states that she has been having 

diarrhea for at least 2 months and it is not resolving. She describes the 

diarrhea to be watery, non-bloody, and she usually gets 2-3 episodes per day. 

She denies having any abdominal pain. She is also complaining of generalized 

weakness and fatigue. She states that she has not eaten anything today due to 

feeling nausea but she denies vomiting. Patient admits to recent travel to [facility] 2 weeks ago but denies getting sick. She states that she had a 

colonoscopy 2 weeks ago and the results were normal. She also admits to recent 

antibiotics use that was given to her in ED last month for diarrhea. She denies 

fevers, chills, cough, shortness of breath, chest pain, or urinary symptoms.





During hospital admission:





64 y/o female with no significant PMHx was hospitalized for diarrhea x 2 months 

and weakness. She has denied diarrhea ever since she was in the hospital, 

however, was able to provide a stool sample on the final day. C. diff, h. 

pylori, and stool O&P will be sent out on the day of discharge, 11/1. She will 

have to follow up with her primary care provider for results and further 

management. During hospitalization, patient has denied abdominal pain and any 

other pain. Upon arrival, she also had electrolytes replaced and had IV fluids; 

her dehydration and acidosis was very likely secondary to her diarrhea. Patient 

states she feels significantly better after the electrolyte and fluids 

replenishment. She states she has a primary care follow up already made on Nov 13 and bring her colonoscopy results.





Of note, patient states she has lost 30 lbs over the past few months. She was 

advised to follow up with possible imaging and/or further work up with her 

primary care physician. Thyroid studies ordered were within normal limits.

















Patient is stable for discharge. Patient does not need new medications. Patient 

must follow up with PMD at [facility] within one week of discharge for 

continued care and to follow up stool study results, unintentional weight loss, 

and possible outpatient abdominal CT (at the discretion of the PMD). 


If symptoms worsen or reoccur, patient should return to the nearest ED.





Patient agrees with the above.











*Note this is a summary of hospital events. Please refer to EMR for full 

admission details.





- Date & Time of H&P


Date of H&P: 11/01/18


Time of H&P: 12:29





Discharge Exam





- Head Exam


Head Exam: ATRAUMATIC, NORMAL INSPECTION





- Eye Exam


Eye Exam: EOMI, Normal appearance





- ENT Exam


ENT Exam: Mucous Membranes Moist





- Respiratory Exam


Respiratory Exam: Clear to PA & Lateral, NORMAL BREATHING PATTERN, UNREMARKABLE





- Cardiovascular Exam


Cardiovascular Exam: REGULAR RHYTHM





- GI/Abdominal Exam


GI & Abdominal Exam: Normal Bowel Sounds, Soft, Unremarkable.  absent: 

Tenderness





- Extremities Exam


Extremities exam: normal inspection





- Neurological Exam


Neurological exam: Alert, Normal Gait, Oriented x3





- Psychiatric Exam


Psychiatric exam: Normal Affect, Normal Mood





- Skin


Skin Exam: Dry, Intact, Normal Color, Warm





Discharge Plan





- Follow Up Plan


Condition: FAIR


Disposition: HOME/ ROUTINE


Instructions:  Hypokalemia (DC), Viral Gastroenteritis, Adult (DC), Generalized 

Weakness (DC)


Additional Instructions: 














El paciente es estable para el gera. El paciente no necesita nuevos 

medicamentos. El paciente debe realizar un seguimiento con PMD en [facility] dentro de melchor semana despus del gera para continuar con el 

tratamiento y para realizar un seguimiento de los resultados del estudio de 

heces, la prdida de peso involuntaria y la posible TC abdominal para pacientes 

ambulatorios (a discrecin de la PMD). El paciente debe traer rivas resultados de 

colonoscopia a la clinica.





Si los sntomas empeoran o reaparecen, el paciente debe regresar al servicio de 

urgencias ms cercano.





El paciente est de acuerdo con lo anterior.














Patient is stable for discharge. Patient does not need new medications. Patient 

must follow up with PMD at [facility] within one week of discharge for 

continued care and to follow up stool study results, unintentional weight loss, 

and possible outpatient abdominal CT (at the discretion of the PMD). The patient

should bring colonoscopy results to the clinic.





If symptoms worsen or reoccur, patient should return to the nearest ED.





Patient agrees with the above.





Referrals: 


Clinic,Med Surg [Non-Staff] - 





<Mendoza Maier - Last Filed: 11/01/18 13:19>





Provider





- Provider


Date of Admission: 


10/30/18 09:48





Attending physician: 


Mendoza Maier DO








Hospital Course





- Lab Results


Lab Results: 


                             Most Recent Lab Values











WBC  5.6 K/uL (4.8-10.8)   11/01/18  07:39    


 


RBC  3.59 Mil/uL (3.80-5.20)  L  11/01/18  07:39    


 


Hgb  12.2 g/dL (11.0-16.0)   11/01/18  07:39    


 


Hct  34.7 % (34.0-47.0)   11/01/18  07:39    


 


MCV  96.7 fL (81.0-99.0)   11/01/18  07:39    


 


MCH  34.1 pg (27.0-31.0)  H  11/01/18  07:39    


 


MCHC  35.3 g/dL (33.0-37.0)   11/01/18  07:39    


 


RDW  14.3 % (11.5-14.5)   11/01/18  07:39    


 


Plt Count  270 K/uL (130-400)   11/01/18  07:39    


 


MPV  9.1 fL (7.2-11.7)   11/01/18  07:39    


 


Neut % (Auto)  38.2 % (50.0-75.0)  L  11/01/18  07:39    


 


Lymph % (Auto)  47.6 % (20.0-40.0)  H  11/01/18  07:39    


 


Mono % (Auto)  12.4 % (0.0-10.0)  H  11/01/18  07:39    


 


Eos % (Auto)  0.9 % (0.0-4.0)   11/01/18  07:39    


 


Baso % (Auto)  0.9 % (0.0-2.0)   11/01/18  07:39    


 


Neut # (Auto)  2.1 K/uL (1.8-7.0)   11/01/18  07:39    


 


Lymph # (Auto)  2.7 K/uL (1.0-4.3)   11/01/18  07:39    


 


Mono # (Auto)  0.7 K/uL (0.0-0.8)   11/01/18  07:39    


 


Eos # (Auto)  0.0 K/uL (0.0-0.7)   11/01/18  07:39    


 


Baso # (Auto)  0.1 K/uL (0.0-0.2)   11/01/18  07:39    


 


PT  18.6 SECONDS (9.7-12.2)  H  10/30/18  06:17    


 


INR  1.7   10/30/18  06:17    


 


APTT  44 SECONDS (21-34)  H  10/30/18  06:17    


 


pO2  19 mm/Hg (30-55)  L  10/30/18  06:20    


 


VBG pH  7.20  (7.32-7.43)  L  10/30/18  06:20    


 


VBG pCO2  34 mmHg (40-60)  L  10/30/18  06:20    


 


VBG HCO3  12.1 mmol/L  10/30/18  06:20    


 


VBG Total CO2  14.3 mmol/L (22-28)  L  10/30/18  06:20    


 


VBG O2 Sat (Calc)  36.7 % (40-65)  L  10/30/18  06:20    


 


VBG Base Excess  -13.7 mmol/L (0.0-2.0)  L  10/30/18  06:20    


 


VBG Potassium  2.1 mmol/L (3.6-5.2)  L*  10/30/18  06:20    


 


Sodium  141.0 mmol/l (132-148)   10/30/18  06:20    


 


Chloride  110.0 mmol/L ()  H  10/30/18  06:20    


 


Glucose  102 mg/dl ()   10/30/18  06:20    


 


Lactate  1.7 mmol/L (0.7-2.1)   10/30/18  06:20    


 


Crit Value Called To  seun El.rn   10/30/18  06:20    


 


Crit Value Called By  Eulogio barber   10/30/18  06:20    


 


Crit Value Read Back  Y   10/30/18  06:20    


 


Blood Gas Notified Time  624   10/30/18  06:20    


 


Sodium  134 mmol/L (132-148)   11/01/18  07:39    


 


Potassium  4.0 mmol/L (3.6-5.2)   11/01/18  07:39    


 


Chloride  105 mmol/L ()   11/01/18  07:39    


 


Carbon Dioxide  26 mmol/L (22-30)   11/01/18  07:39    


 


Anion Gap  7  (10-20)  L  11/01/18  07:39    


 


BUN  4 mg/dL (7-17)  L  11/01/18  07:39    


 


Creatinine  0.5 mg/dL (0.7-1.2)  L  11/01/18  07:39    


 


Est GFR ( Amer)  > 60   11/01/18  07:39    


 


Est GFR (Non-Af Amer)  > 60   11/01/18  07:39    


 


Random Glucose  77 mg/dL ()   11/01/18  07:39    


 


Calcium  7.6 mg/dl (8.6-10.4)  L  11/01/18  07:39    


 


Phosphorus  5.2 mg/dL (2.5-4.5)  H  11/01/18  07:39    


 


Magnesium  2.1 mg/dL (1.6-2.3)   11/01/18  07:39    


 


Total Bilirubin  0.5 mg/dL (0.2-1.3)   11/01/18  07:39    


 


AST  26 U/L (14-36)   11/01/18  07:39    


 


ALT  37 U/L (9-52)   11/01/18  07:39    


 


Alkaline Phosphatase  100 U/L ()   11/01/18  07:39    


 


Total Protein  5.0 g/dL (6.3-8.3)  L  11/01/18  07:39    


 


Albumin  2.1 g/dL (3.5-5.0)  L  11/01/18  07:39    


 


Globulin  2.9 gm/dL (2.2-3.9)   11/01/18  07:39    


 


Albumin/Globulin Ratio  0.7  (1.0-2.1)  L  11/01/18  07:39    


 


Lipase  107 U/L ()   10/30/18  06:17    


 


Free T4  0.91 ng/dL (0.78-2.19)   10/30/18  14:23    


 


TSH 3rd Generation  1.64 mIU/L (0.46-4.68)   10/30/18  14:23    


 


Cortisol AM Sample  9.1 ug/dL (4.46-22.7)   10/30/18  14:23    


 


Venous Blood Potassium  2.1 mmol/L (3.6-5.2)  L*  10/30/18  06:20    


 


Urine Color  Colorless  (YELLOW)   10/30/18  08:02    


 


Urine Clarity  Clear  (Clear)   10/30/18  08:02    


 


Urine pH  5.0  (5.0-8.0)   10/30/18  08:02    


 


Ur Specific Gravity  1.005  (1.003-1.030)   10/30/18  08:02    


 


Urine Protein  Negative mg/dL (NEGATIVE)   10/30/18  08:02    


 


Urine Glucose (UA)  Normal mg/dL (Normal)   10/30/18  08:02    


 


Urine Ketones  Negative mg/dL (NEGATIVE)   10/30/18  08:02    


 


Urine Blood  1+  (NEGATIVE)  H  10/30/18  08:02    


 


Urine Nitrate  Negative  (NEGATIVE)   10/30/18  08:02    


 


Urine Bilirubin  Negative  (NEGATIVE)   10/30/18  08:02    


 


Urine Urobilinogen  Normal mg/dL (0.2-1.0)   10/30/18  08:02    


 


Ur Leukocyte Esterase  Neg Marion/uL (Negative)   10/30/18  08:02    


 


Urine WBC (Auto)  < 1 /hpf (0-5)   10/30/18  08:02    


 


Urine RBC (Auto)  2 /hpf (0-3)   10/30/18  08:02    


 


Ur Squamous Epith Cells  < 1 /hpf (0-5)   10/30/18  08:02    


 


Urine Bacteria  Occ  (<OCC)  H  10/30/18  08:02    


 


Hyaline Casts  0-2 /lpf (0-2)   10/30/18  08:02    


 


Stool Occult Blood  Negative  (NEGATIVE)   11/01/18  11:48    


 


HIV 1&2 Antibody Screen  Negative  (NEGATIVE)   10/31/18  14:01    














Attending/Attestation





- Attestation


I have personally seen and examined this patient.: Yes


I have fully participated in the care of the patient.: Yes


I have reviewed all pertinent clinical information, including history, physical 

exam and plan: Yes


Notes (Text): 





11/01/18 13:14





Medical attending: Patient was seen and examined by me. Agree with the above 

note by the resident


The patient was able to walk in the hallways without issue. She has not had 

diarrhea in two days.


Tolerating diet as well 


The metabolic acidosis she initially had was resolved. Serum bicarb is fine, 


Also the hypokalemia is now resolved as well





Patient will need to continue to follow with the [facility]


Mendoza Maier

## 2018-11-06 LAB
CHLORIDE STL-SCNC: (no result) MMOL/L
POTASSIUM STL-SCNC: (no result) MMOL/L
STOOL SODIUM: (no result)

## 2019-01-03 ENCOUNTER — HOSPITAL ENCOUNTER (EMERGENCY)
Dept: HOSPITAL 31 - C.ER | Age: 64
Discharge: HOME | End: 2019-01-03
Payer: COMMERCIAL

## 2019-01-03 VITALS — RESPIRATION RATE: 18 BRPM | OXYGEN SATURATION: 100 %

## 2019-01-03 VITALS — TEMPERATURE: 97.7 F | DIASTOLIC BLOOD PRESSURE: 73 MMHG | SYSTOLIC BLOOD PRESSURE: 120 MMHG | HEART RATE: 70 BPM

## 2019-01-03 VITALS — BODY MASS INDEX: 17.7 KG/M2

## 2019-01-03 DIAGNOSIS — E87.6: Primary | ICD-10-CM

## 2019-01-03 LAB
ALBUMIN SERPL-MCNC: 2.8 {NULL, G/DL} (ref 3.5–5)
ALBUMIN/GLOB SERPL: 0.9 {NULL, NULL} (ref 1–2.1)
ALT SERPL-CCNC: 37 {NULL, U/L} (ref 9–52)
AST SERPL-CCNC: 23 {NULL, U/L} (ref 14–36)
BASOPHILS # BLD AUTO: 0 {NULL, K/UL} (ref 0–0.2)
BASOPHILS NFR BLD: 0.3 {NULL, %} (ref 0–2)
BILIRUB UR-MCNC: NEGATIVE {NULL, NULL}
BUN SERPL-MCNC: 6 {NULL, MG/DL} (ref 7–17)
CALCIUM SERPL-MCNC: 7.8 {NULL, MG/DL} (ref 8.6–10.4)
EOSINOPHIL # BLD AUTO: 0 {NULL, K/UL} (ref 0–0.7)
EOSINOPHIL NFR BLD: 0.6 {NULL, %} (ref 0–4)
ERYTHROCYTE [DISTWIDTH] IN BLOOD BY AUTOMATED COUNT: 19.3 {NULL, %} (ref 11.5–14.5)
GFR NON-AFRICAN AMERICAN: > 60 {NULL, NULL}
GLUCOSE UR STRIP-MCNC: NORMAL {NULL, MG/DL}
HGB BLD-MCNC: 11.7 {NULL, G/DL} (ref 11–16)
LEUKOCYTE ESTERASE UR-ACNC: (no result) {NULL, LEU/UL}
LYMPHOCYTES # BLD AUTO: 2.9 {NULL, K/UL} (ref 1–4.3)
LYMPHOCYTES NFR BLD AUTO: 48.9 {NULL, %} (ref 20–40)
MCH RBC QN AUTO: 36 {NULL, PG} (ref 27–31)
MCHC RBC AUTO-ENTMCNC: 34.1 {NULL, G/DL} (ref 33–37)
MCV RBC AUTO: 105.7 {NULL, FL} (ref 81–99)
MONOCYTES # BLD: 0.6 {NULL, K/UL} (ref 0–0.8)
MONOCYTES NFR BLD: 10.9 {NULL, %} (ref 0–10)
NEUTROPHILS # BLD: 2.3 {NULL, K/UL} (ref 1.8–7)
NEUTROPHILS NFR BLD AUTO: 39.3 {NULL, %} (ref 50–75)
NRBC BLD AUTO-RTO: 0 {NULL, %} (ref 0–2)
PH UR STRIP: 5 {NULL, NULL} (ref 5–8)
PLATELET # BLD: 228 {NULL, K/UL} (ref 130–400)
PMV BLD AUTO: 8.2 {NULL, FL} (ref 7.2–11.7)
PROT UR STRIP-MCNC: NEGATIVE {NULL, MG/DL}
RBC # BLD AUTO: 3.24 {NULL, MIL/UL} (ref 3.8–5.2)
RBC # UR STRIP: NEGATIVE {NULL, NULL}
SP GR UR STRIP: 1.01 {NULL, NULL} (ref 1–1.03)
SQUAMOUS EPITHIAL: 1 {NULL, /HPF} (ref 0–5)
UROBILINOGEN UR-MCNC: NORMAL {NULL, MG/DL} (ref 0.2–1)
WBC # BLD AUTO: 5.9 {NULL, K/UL} (ref 4.8–10.8)

## 2019-01-03 PROCEDURE — 96360 HYDRATION IV INFUSION INIT: CPT

## 2019-01-03 PROCEDURE — 82948 REAGENT STRIP/BLOOD GLUCOSE: CPT

## 2019-01-03 PROCEDURE — 85025 COMPLETE CBC W/AUTO DIFF WBC: CPT

## 2019-01-03 PROCEDURE — 80053 COMPREHEN METABOLIC PANEL: CPT

## 2019-01-03 PROCEDURE — 81001 URINALYSIS AUTO W/SCOPE: CPT

## 2019-01-03 PROCEDURE — 93005 ELECTROCARDIOGRAM TRACING: CPT

## 2019-01-03 PROCEDURE — 83735 ASSAY OF MAGNESIUM: CPT

## 2019-01-03 PROCEDURE — 99283 EMERGENCY DEPT VISIT LOW MDM: CPT

## 2019-01-03 NOTE — C.PDOC
History Of Present Illness


63 year old female presents to the ED complaining of feeling weak and dizzy. 

Patient states she has had similar episodes in the past, during which her 

potassium was found to be low. she otherwise denies any chest pain, SOB, 

numbness, tingling, or headache. Additionally patient complains of chronic bowel

issues, and having frequent diarrhea. She attributes her low potassium to this 

diarrhea. 





Time Seen by Provider: 19 14:19


Chief Complaint (Nursing): Medical Clearance


History Per: Patient


History/Exam Limitations: no limitations


Onset/Duration Of Symptoms: Hrs


Current Symptoms Are (Timing): Still Present





Past Medical History


Reviewed: Historical Data, Nursing Documentation, Vital Signs


Vital Signs: 





                                Last Vital Signs











Temp  98 F   19 14:06


 


Pulse  81   19 14:06


 


Resp  18   19 14:06


 


BP  120/81   19 14:06


 


Pulse Ox  100   19 14:06














- Medical History


PMH: Gastritis


   Denies: HTN


Surgical History: Cholecystectomy


Family History: States: No Known Family Hx





- Social History


Hx Tobacco Use: No


Hx Alcohol Use: No


Hx Substance Use: No





- Immunization History


Hx Tetanus Toxoid Vaccination: No


Hx Influenza Vaccination: No


Hx Pneumococcal Vaccination: No





Review Of Systems


Constitutional: Positive for: Weakness.  Negative for: Fever, Chills


Cardiovascular: Negative for: Chest Pain


Respiratory: Negative for: Shortness of Breath


Gastrointestinal: Positive for: Diarrhea.  Negative for: Nausea, Vomiting


Neurological: Positive for: Dizziness.  Negative for: Numbness, Incoordination, 

Confusion, Headache





Physical Exam





- Physical Exam


Appears: Non-toxic, No Acute Distress, Other (elderly appearing, thin)


Skin: Warm, Dry, No Rash


Head: Atraumatic, Normacephalic


Eye(s): bilateral: Normal Inspection


Oral Mucosa: Moist


Neck: Normal ROM


Chest: Symmetrical


Cardiovascular: Rhythm Regular, No Murmur


Respiratory: Normal Breath Sounds, No Rales, No Rhonchi, No Wheezing


Extremity: Bilateral: Atraumatic, Normal Color And Temperature, Normal ROM


Neurological/Psych: Oriented x3, Normal Speech





ED Course And Treatment





- Laboratory Results


Result Diagrams: 


                                 19 14:33





                                 19 14:33


Lab Interpretation: Abnormal


ECG: Interpreted By Me, Viewed By Me


ECG Rhythm: Sinus Rhythm


ECG Interpretation: No Acute Changes


Rate From EC (PAC, no Twave abnormality)


O2 Sat by Pulse Oximetry: 100 (RA)


Pulse Ox Interpretation: Normal





Medical Decision Making


Medical Decision Making: 





Plan:


--EKG


--Blood work


--Urinalysis


--IV fluids





Labs reviewed. Slight hypokalemia and corrected. Patient remained well in no 

distress. She had no fever and stable vital signs. She was stable for discharge.

Advise to follow up with PMD





Disposition


Counseled Patient/Family Regarding: Diagnosis, Need For Followup





- Disposition


Referrals: 


Rhiannon Vuong MD [Staff Provider] - 


Disposition Time: 17:14


Condition: STABLE


Additional Instructions: 


Usted fue evaluado hoy por rivas sntomas agudos. Los estudios realizados muestran

que tiene un bajo nivel de potasio y se trata con medicamentos orales.


Fredy un seguimiento en la clnica o con herzog mdico para obtener ms atencin y 

manejo.


Instructions:  Hypokalemia (DC)


Print Language: Indonesian





- POA


Present On Arrival: None





- Clinical Impression


Clinical Impression: 


 Hypokalemia








- PA / NP / Resident Statement


MD/DO has reviewed & agrees with the documentation as recorded.





- Scribe Statement


The provider has reviewed the documentation as recorded by the Juan Carlos Orozco





All medical record entries made by the Scribe were at my direction and 

personally dictated by me. I have reviewed the chart and agree that the record 

accurately reflects my personal performance of the history, physical exam, 

medical decision making, and the department course for this patient. I have also

personally directed, reviewed, and agree with the discharge instructions and 

disposition.

## 2019-01-06 NOTE — CARD
--------------- APPROVED REPORT --------------





Date of service: 01/03/2019



EKG Measurement

Heart [id number]

MD [id number]

[id number]

[id number]

[id number]



<Conclusion>

Sinus rhythm with premature atrial complexes

Low voltage QRS

Borderline ECG

## 2019-04-02 ENCOUNTER — HOSPITAL ENCOUNTER (EMERGENCY)
Dept: HOSPITAL 31 - C.ER | Age: 64
Discharge: HOME | End: 2019-04-02
Payer: COMMERCIAL

## 2019-04-02 VITALS — BODY MASS INDEX: 17.1 KG/M2

## 2019-04-02 VITALS
SYSTOLIC BLOOD PRESSURE: 100 MMHG | HEART RATE: 74 BPM | OXYGEN SATURATION: 97 % | TEMPERATURE: 98.4 F | DIASTOLIC BLOOD PRESSURE: 62 MMHG | RESPIRATION RATE: 18 BRPM

## 2019-04-02 DIAGNOSIS — M25.562: ICD-10-CM

## 2019-04-02 DIAGNOSIS — S80.02XA: Primary | ICD-10-CM

## 2019-04-02 DIAGNOSIS — M25.462: ICD-10-CM
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
## 2019-04-02 PROCEDURE — 99284 EMERGENCY DEPT VISIT MOD MDM: CPT

## 2019-04-02 PROCEDURE — 97161 PT EVAL LOW COMPLEX 20 MIN: CPT

## 2019-04-02 PROCEDURE — 73562 X-RAY EXAM OF KNEE 3: CPT

## 2019-04-02 PROCEDURE — 97116 GAIT TRAINING THERAPY: CPT

## 2019-04-02 NOTE — C.PDOC
History Of Present Illness


64 year old female, presents to the ED for evaluation after sustaining a 

mechanical fall yesterday. Patient states she was walking when she tripped, fell

and landed onto her left knee. Patient is now complaining of pain and swelling 

to her left knee. She has not taken anything for the pain. Patient also reports 

she has been experiencing diarrhea for months and has lost around 50-60 pounds 

of weight. She states that she is currently being worked up for this and has not

yet received a definitive diagnosis. Patient denies head injury, LOC, dizziness,

headache, neck pain, chest pain, shortness of breath, abdominal pain. 


Time Seen by Provider: 04/02/19 12:38


Chief Complaint (Nursing): Lower Extremity Problem/Injury


History Per: Patient


History/Exam Limitations: no limitations


Onset/Duration Of Symptoms: Hrs


Current Symptoms Are (Timing): Still Present


Additional History Per: Patient





- Knee


Description Of Injury: Fell





Past Medical History


Reviewed: Historical Data, Nursing Documentation, Vital Signs





- Medical History


PMH: Gastritis


   Denies: HTN


Surgical History: Cholecystectomy


Family History: States: Unknown Family Hx





- Social History


Hx Tobacco Use: No


Hx Alcohol Use: No


Hx Substance Use: No





- Immunization History


Hx Tetanus Toxoid Vaccination: No


Hx Influenza Vaccination: No


Hx Pneumococcal Vaccination: No





Review Of Systems


Constitutional: Positive for: Weight loss


Cardiovascular: Negative for: Chest Pain


Respiratory: Negative for: Shortness of Breath


Gastrointestinal: Positive for: Diarrhea.  Negative for: Abdominal Pain


Musculoskeletal: Positive for: Other (left knee pain and swelling ).  Negative 

for: Neck Pain


Neurological: Negative for: Weakness, Numbness, Headache, Other (head injury, 

LOC )





Physical Exam





- Physical Exam


Appears: Non-toxic, No Acute Distress, Chronically Ill, Other (frail )


Skin: Warm, Dry, Pale


Head: Atraumatic, Normacephalic


Eye(s): bilateral: Normal Inspection


Oral Mucosa: Moist


Neck: Supple


Chest: Symmetrical, No Deformity, No Tenderness


Cardiovascular: Rhythm Regular, No Murmur


Respiratory: Normal Breath Sounds, No Rales, No Rhonchi, No Wheezing


Gastrointestinal/Abdominal: Soft, No Tenderness, No Guarding, No Rebound


Extremity: No Normal ROM (limited ROM of left knee secondary to pain ), 

Tenderness (to left knee ), Capillary Refill (less than 2 seconds ), Swelling (

to left knee )


Pulses: Left Dorsalis Pedis: Normal, Right Dorsalis Pedis: Normal


Neurological/Psych: Normal Speech, Normal Cognition, Normal Sensation





ED Course And Treatment





- Other Rad


  ** left knee XR


X-Ray: Viewed By Me, Read By Radiologist


Interpretation: Date of service:  04/02/2019.  PROCEDURE:  Left Knee 

Radiographs.  HISTORY:  Pain.  COMPARISON:  None.  TECHNIQUE:  2 views obtained.

 FINDINGS:  BONES:  No fracture.  JOINTS:  Minimal degenerative changes.  JOINT 

EFFUSION:  Suprapatellar joint effusion.  OTHER FINDINGS:  Trace lateral 

patellar tilt.  No joss subluxation.  IMPRESSION:  Fracture or lytic lesion.  

Left suprapatellar joint effusion.  Minimal degenerative the joint-space 

narrowing





Medical Decision Making


Medical Decision Making: 





Progress: 


Left knee XR ordered and reviewed. 


Motrin PO and Tylenol PO given. 


Ice applied to left knee. 











Disposition





- Disposition


Referrals: 


[facility] [Outside]


Disposition: HOME/ ROUTINE


Disposition Time: 15:26


Condition: GUARDED


Prescriptions: 


Ibuprofen [Motrin] 600 mg PO TID #15 tab


Instructions:  Contusion (DC)


Forms:  [employer] (South African), Gen Discharge Inst South African





- POA


Present On Arrival: None





- Clinical Impression


Clinical Impression: 


 Joint swelling, Joint pain, Contusion of knee, left








- Scribe Statement


The provider has reviewed the documentation as recorded by the Scribe (So Ibanez)


Provider Attestation: 








All medical record entries made by the Scribe were at my direction and 

personally dictated by me. I have reviewed the chart and agree that the record 

accurately reflects my personal performance of the history, physical exam, m

edical decision making, and the department course for this patient. I have also 

personally directed, reviewed, and agree with the discharge instructions and 

disposition.

## 2019-04-02 NOTE — RAD
Date of service: 



04/02/2019



PROCEDURE:  Left Knee Radiographs.



HISTORY:

Pain.



COMPARISON:

None.



TECHNIQUE:

2 views obtained.



FINDINGS:



BONES:

No fracture. 



JOINTS:

Minimal degenerative changes. 



JOINT EFFUSION:

Suprapatellar joint effusion. 



OTHER FINDINGS:

Trace lateral patellar tilt.  No joss subluxation.



IMPRESSION:

Fracture or lytic lesion.  Left suprapatellar joint effusion. 



Minimal degenerative the joint-space narrowing

## 2019-04-11 ENCOUNTER — HOSPITAL ENCOUNTER (EMERGENCY)
Dept: HOSPITAL 31 - C.ER | Age: 64
Discharge: HOME | End: 2019-04-11
Payer: COMMERCIAL

## 2019-04-11 VITALS
OXYGEN SATURATION: 100 % | RESPIRATION RATE: 16 BRPM | SYSTOLIC BLOOD PRESSURE: 118 MMHG | DIASTOLIC BLOOD PRESSURE: 72 MMHG | HEART RATE: 80 BPM

## 2019-04-11 VITALS — BODY MASS INDEX: 14.1 KG/M2

## 2019-04-11 DIAGNOSIS — R42: Primary | ICD-10-CM

## 2019-04-11 LAB
ALBUMIN SERPL-MCNC: 3.2 G/DL (ref 3.5–5)
ALBUMIN/GLOB SERPL: 1 {RATIO} (ref 1–2.1)
ALT SERPL-CCNC: 37 U/L (ref 9–52)
AST SERPL-CCNC: 36 U/L (ref 14–36)
BASOPHILS # BLD AUTO: 0 K/UL (ref 0–0.2)
BASOPHILS NFR BLD: 0.3 % (ref 0–2)
BILIRUB UR-MCNC: NEGATIVE MG/DL
BUN SERPL-MCNC: 7 MG/DL (ref 7–17)
CALCIUM SERPL-MCNC: 9 MG/DL (ref 8.6–10.4)
EOSINOPHIL # BLD AUTO: 0.1 K/UL (ref 0–0.7)
EOSINOPHIL NFR BLD: 1.1 % (ref 0–4)
ERYTHROCYTE [DISTWIDTH] IN BLOOD BY AUTOMATED COUNT: 14.2 % (ref 11.5–14.5)
GFR NON-AFRICAN AMERICAN: > 60
GLUCOSE UR STRIP-MCNC: NORMAL MG/DL
HGB BLD-MCNC: 12.3 G/DL (ref 11–16)
LEUKOCYTE ESTERASE UR-ACNC: (no result) LEU/UL
LYMPHOCYTES # BLD AUTO: 2.3 K/UL (ref 1–4.3)
LYMPHOCYTES NFR BLD AUTO: 30.1 % (ref 20–40)
MCH RBC QN AUTO: 41.2 PG (ref 27–31)
MCHC RBC AUTO-ENTMCNC: 32.8 G/DL (ref 33–37)
MCV RBC AUTO: 125.4 FL (ref 81–99)
MONOCYTES # BLD: 0.9 K/UL (ref 0–0.8)
MONOCYTES NFR BLD: 11.9 % (ref 0–10)
NEUTROPHILS # BLD: 4.3 K/UL (ref 1.8–7)
NEUTROPHILS NFR BLD AUTO: 56.6 % (ref 50–75)
NRBC BLD AUTO-RTO: 0.1 % (ref 0–2)
PH UR STRIP: 5 [PH] (ref 5–8)
PLATELET # BLD: 225 K/UL (ref 130–400)
PMV BLD AUTO: 8.4 FL (ref 7.2–11.7)
PROT UR STRIP-MCNC: NEGATIVE MG/DL
RBC # BLD AUTO: 2.99 MIL/UL (ref 3.8–5.2)
RBC # UR STRIP: (no result) /UL
SP GR UR STRIP: 1.01 (ref 1–1.03)
SQUAMOUS EPITHIAL: < 1 /HPF (ref 0–5)
UROBILINOGEN UR-MCNC: NORMAL MG/DL (ref 0.2–1)
WBC # BLD AUTO: 7.6 K/UL (ref 4.8–10.8)

## 2019-04-11 NOTE — RAD
Date of service: 



04/11/2019



PROCEDURE:  CHEST RADIOGRAPH, 1 VIEW



HISTORY:

chest pain



COMPARISON:

10/30/2018



FINDINGS:



LUNGS:

Mid-upper lobe bilateral calcified pleural [id number] 

left and lesser right hemidiaphragmatic calcified pleural 

plaque-similar 



No interval consolidation noted 



PLEURA:

No pneumothorax or pleural fluid seen.



CARDIOVASCULAR:

 No aortic atherosclerotic calcification present. Normal.



OSSEOUS STRUCTURES:

No significant abnormalities.



VISUALIZED UPPER ABDOMEN:

Prior right upper quadrant surgical clips less conspicuous on this 

exam.



OTHER FINDINGS:

None. 



IMPRESSION:

No acute cardiopulmonary pathology noted.  Stable appearing bilateral 

calcified pleural plaques as above

## 2019-04-11 NOTE — C.PDOC
History Of Present Illness


64 year old female presents to ED with complaint of chronic dizziness for 

several weeks. Patient is currently being worked-up for chronic dizziness by the

clinic and patient has an appointment tomorrow morning. Patient denies fall, c

hest pain, SOB, fever,and cough.





Time Seen by Provider: 19 09:41


Chief Complaint (Nursing): Dizziness/Lightheaded


History Per: Patient


History/Exam Limitations: no limitations


Onset/Duration Of Symptoms: Other (several weeks)


Current Symptoms Are (Timing): Still Present





Past Medical History


Reviewed: Historical Data, Nursing Documentation, Vital Signs


Vital Signs: 





                                Last Vital Signs











Temp      


 


Pulse  80   19 11:10


 


Resp  16   19 11:10


 


BP  118/72   19 11:10


 


Pulse Ox  100   19 11:10














- Medical History


PMH: Gastritis


   Denies: HTN


Surgical History: Cholecystectomy


Family History: States: Unknown Family Hx





- Social History


Hx Tobacco Use: No


Hx Alcohol Use: No


Hx Substance Use: No





- Immunization History


Hx Tetanus Toxoid Vaccination: No


Hx Influenza Vaccination: No


Hx Pneumococcal Vaccination: No





Review Of Systems


Constitutional: Negative for: Fever


Cardiovascular: Negative for: Chest Pain


Respiratory: Negative for: Cough, Shortness of Breath


Neurological: Positive for: Dizziness.  Negative for: Weakness, Numbness





Physical Exam





- Physical Exam


Appears: Non-toxic, No Acute Distress, Other (thin )


Skin: Normal Color, Warm, Dry


Head: Atraumatic, Normacephalic


Eye(s): bilateral: Normal Inspection, PERRL, EOMI


Neck: Normal ROM, Supple


Chest: Symmetrical, No Deformity


Cardiovascular: Rhythm Regular, No Murmur


Respiratory: No Accessory Muscle Use, No Rales, No Rhonchi, No Wheezing


Gastrointestinal/Abdominal: Soft, No Tenderness


Extremity: Capillary Refill (<2 seconds)


Neurological/Psych: Oriented x3, Normal Speech, Normal Cognition





ED Course And Treatment





- Laboratory Results


Result Diagrams: 


                                 19 10:33





                                 19 10:33


Lab Results: 





                                        











Troponin I  0.0120 ng/mL (0.00-0.120)   19  10:33    








                                        











Total Bilirubin  0.1 mg/dL (0.2-1.3)  L  19  10:33    


 


AST  36 U/L (14-36)   19  10:33    


 


ALT  37 U/L (9-52)   19  10:33    


 


Alkaline Phosphatase  139 U/L ()  H  19  10:33    


 


Total Protein  6.2 g/dL (6.3-8.3)  L  19  10:33    


 


Albumin  3.2 g/dL (3.5-5.0)  L  19  10:33    


 


Globulin  3.0 gm/dL (2.2-3.9)   19  10:33    


 


Albumin/Globulin Ratio  1.0  (1.0-2.1)   19  10:33    








                                        











Urine Color  Yellow  (YELLOW)   19  10:33    


 


Urine Clarity  Clear  (Clear)   19  10:33    


 


Urine pH  5.0  (5.0-8.0)   19  10:33    


 


Ur Specific Gravity  1.011  (1.003-1.030)   19  10:33    


 


Urine Protein  Negative mg/dL (NEGATIVE)   19  10:33    


 


Urine Glucose (UA)  Normal mg/dL (Normal)   19  10:33    


 


Urine Ketones  Negative mg/dL (NEGATIVE)   19  10:33    


 


Urine Blood  1+  (NEGATIVE)  H  19  10:33    


 


Urine Nitrate  Negative  (NEGATIVE)   19  10:33    


 


Urine Bilirubin  Negative  (NEGATIVE)   19  10:33    


 


Urine Urobilinogen  Normal mg/dL (0.2-1.0)   19  10:33    


 


Ur Leukocyte Esterase  Neg Marion/uL (Negative)   19  10:33    


 


Urine WBC (Auto)  < 1 /hpf (0-5)   19  10:33    


 


Urine RBC (Auto)  2 /hpf (0-3)   19  10:33    


 


Ur Squamous Epith Cells  < 1 /hpf (0-5)   19  10:33    











ECG: Interpreted By Me, Viewed By Me


ECG Rhythm: Sinus Rhythm


ECG Interpretation: Normal


Interpretation Of ECG: Normal axis. Normal intervals.


Rate From EC


O2 Sat by Pulse Oximetry: 100 (in RA)





- Other Rad


  ** CXR


X-Ray: Interpreted by Me, Viewed By Me


Interpretation: IMPRESSION:  No acute cardiopulmonary pathology noted.  Stable 

appearing bilateral calcified pleural plaques as above





Medical Decision Making


Medical Decision Making: 


Impression: 64 year old female presents to ED with complaint of chronic 

dizziness for several weeks.





Plan:


EKG and CXR ordered for patient


Labs ordered with CMP, CBC, urine culture, and UA


Antivert PO given to patient











Disposition





- Disposition


Referrals: 


[facility] [Outside]


[facility] [Outside]


Disposition: HOME/ ROUTINE


Disposition Time: 11:30


Condition: GOOD


Additional Instructions: 





TIMOTEO URBINA, thank you for letting us take care of you today. 

The emergency medical care you received today was directed at your acute 

symptoms. If you were prescribed any medication, please fill it and take as 

directed. It may take several days for your symptoms to resolve. Return to the 

Emergency Department if your symptoms worsen, do not improve, or if you have any

 other problems.





Please contact your doctor or call one of the physicians/clinics you have been 

referred to that are listed on the Patient Visit Information form that is 

included in your discharge packet. Bring any paperwork you were given at 

discharge with you along with any medications you are taking to your follow up 

visit. Our treatment cannot replace ongoing medical care by a primary care 

provider outside of the emergency department.





Thank you for allowing the [facility] team to be part of your care today.











It is important that your follow up with the clinic tomorrow.  They may start 

you on medication and vitamin supplements that need to be monitored on an 

outpatient basis.











TIMOTEO URBINA, cally por dejarnos cuidar de ti hoy. La 

atencin mdica de emergencia que recibi hoy se dirigi a rivas sntomas agudos. 

Si le recetaron algn medicamento, llnelo y tmelo segn las indicaciones. Los 

sntomas pueden tardar varios kern en resolverse. Regrese al Departamento de 

Emergencias si rivas sntomas empeoran, no mejoran o si tiene otros problemas.





Comunquese con herzog mdico o llame a marina de los mdicos / clnicas a los que ha 

sido referido que figuran en el formulario de Informacin de visita al paciente 

que se incluye en herzog paquete de gera. Lleve todos los documentos que recibi al 

momento del gera junto con los medicamentos que est tomando para herzog visita de 

seguimiento. Nuestro tratamiento no puede reemplazar la atencin mdica continua

 por parte de un proveedor de atencin primaria fuera del departamento de 

emergencias.





Cally por permitir que el equipo de [facility] sea parte de herzog atencin 

hoy.











Es importante que herzog seguimiento con la clnica maana. Es posible que comiencen

 a maria del carmen medicamentos y suplementos vitamnicos que deben controlarse de forma 

ambulatoria.


Instructions:  Vertigo (a Type of Dizziness) (DC)


Forms:  [employer] (Malay)


Print Language: Syriac





- Clinical Impression


Clinical Impression: 


 Dizziness








- Scribe Statement


The provider has reviewed the documentation as recorded by the Scribe (Rica Dodge)








All medical record entries made by the Scribe were at my direction and 

personally dictated by me. I have reviewed the chart and agree that the record 

accurately reflects my personal performance of the history, physical exam, 

medical decision making, and the department course for this patient. I have also

 personally directed, reviewed, and agree with the discharge instructions and 

disposition.

## 2019-04-11 NOTE — C.PDOC
Time Seen by Provider: 04/11/19 09:41


Chief Complaint (Nursing): Dizziness/Lightheaded





Past Medical History





- Medical History


PMH: Gastritis


   Denies: HTN


Surgical History: Cholecystectomy


Family History: States: Unknown Family Hx





- Social History


Hx Tobacco Use: No


Hx Alcohol Use: No


Hx Substance Use: No





- Immunization History


Hx Tetanus Toxoid Vaccination: No


Hx Influenza Vaccination: No


Hx Pneumococcal Vaccination: No





ED Course And Treatment





- Laboratory Results


Result Diagrams: 


                                 04/11/19 10:33








Disposition





- Disposition

## 2019-04-12 NOTE — CARD
--------------- APPROVED REPORT --------------





Date of service: 04/11/2019



EKG Measurement

Heart [id number]

WV [id number]

[id number]

[id number]

[id number]



<Conclusion>

Normal sinus rhythm

Normal ECG